# Patient Record
Sex: MALE | Race: WHITE | NOT HISPANIC OR LATINO | Employment: PART TIME | URBAN - METROPOLITAN AREA
[De-identification: names, ages, dates, MRNs, and addresses within clinical notes are randomized per-mention and may not be internally consistent; named-entity substitution may affect disease eponyms.]

---
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
## 2022-06-06 ENCOUNTER — HOSPITAL ENCOUNTER (EMERGENCY)
Facility: CLINIC | Age: 22
Discharge: HOME | End: 2022-06-06
Attending: EMERGENCY MEDICINE | Admitting: EMERGENCY MEDICINE
Payer: COMMERCIAL

## 2022-06-06 ENCOUNTER — APPOINTMENT (OUTPATIENT)
Dept: CT IMAGING | Facility: CLINIC | Age: 22
End: 2022-06-06
Attending: EMERGENCY MEDICINE
Payer: COMMERCIAL

## 2022-06-06 VITALS
RESPIRATION RATE: 18 BRPM | HEART RATE: 75 BPM | TEMPERATURE: 99.1 F | HEIGHT: 76 IN | WEIGHT: 195 LBS | SYSTOLIC BLOOD PRESSURE: 120 MMHG | OXYGEN SATURATION: 98 % | BODY MASS INDEX: 23.75 KG/M2 | DIASTOLIC BLOOD PRESSURE: 67 MMHG

## 2022-06-06 DIAGNOSIS — R42 LIGHTHEADEDNESS: ICD-10-CM

## 2022-06-06 LAB
ALBUMIN SERPL-MCNC: 4.3 G/DL (ref 3.4–5)
ALP SERPL-CCNC: 63 U/L (ref 40–150)
ALT SERPL W P-5'-P-CCNC: 18 U/L (ref 0–70)
ANION GAP SERPL CALCULATED.3IONS-SCNC: 5 MMOL/L (ref 3–14)
AST SERPL W P-5'-P-CCNC: 11 U/L (ref 0–45)
BASOPHILS # BLD AUTO: 0 10E3/UL (ref 0–0.2)
BASOPHILS NFR BLD AUTO: 0 %
BILIRUB SERPL-MCNC: 0.5 MG/DL (ref 0.2–1.3)
BUN SERPL-MCNC: 13 MG/DL (ref 7–30)
CALCIUM SERPL-MCNC: 8.8 MG/DL (ref 8.5–10.1)
CHLORIDE BLD-SCNC: 107 MMOL/L (ref 94–109)
CO2 SERPL-SCNC: 30 MMOL/L (ref 20–32)
CREAT SERPL-MCNC: 1.04 MG/DL (ref 0.66–1.25)
CRP SERPL-MCNC: <2.9 MG/L (ref 0–8)
EOSINOPHIL # BLD AUTO: 0.1 10E3/UL (ref 0–0.7)
EOSINOPHIL NFR BLD AUTO: 2 %
ERYTHROCYTE [DISTWIDTH] IN BLOOD BY AUTOMATED COUNT: 11.9 % (ref 10–15)
ERYTHROCYTE [SEDIMENTATION RATE] IN BLOOD BY WESTERGREN METHOD: 5 MM/HR (ref 0–15)
GFR SERPL CREATININE-BSD FRML MDRD: >90 ML/MIN/1.73M2
GLUCOSE BLD-MCNC: 70 MG/DL (ref 70–99)
HCT VFR BLD AUTO: 46.7 % (ref 40–53)
HGB BLD-MCNC: 15.6 G/DL (ref 13.3–17.7)
IMM GRANULOCYTES # BLD: 0 10E3/UL
IMM GRANULOCYTES NFR BLD: 0 %
LYMPHOCYTES # BLD AUTO: 3.2 10E3/UL (ref 0.8–5.3)
LYMPHOCYTES NFR BLD AUTO: 41 %
MCH RBC QN AUTO: 29.7 PG (ref 26.5–33)
MCHC RBC AUTO-ENTMCNC: 33.4 G/DL (ref 31.5–36.5)
MCV RBC AUTO: 89 FL (ref 78–100)
MONOCYTES # BLD AUTO: 0.6 10E3/UL (ref 0–1.3)
MONOCYTES NFR BLD AUTO: 8 %
NEUTROPHILS # BLD AUTO: 3.8 10E3/UL (ref 1.6–8.3)
NEUTROPHILS NFR BLD AUTO: 49 %
NRBC # BLD AUTO: 0 10E3/UL
NRBC BLD AUTO-RTO: 0 /100
PLATELET # BLD AUTO: 263 10E3/UL (ref 150–450)
POTASSIUM BLD-SCNC: 3.7 MMOL/L (ref 3.4–5.3)
PROT SERPL-MCNC: 7.4 G/DL (ref 6.8–8.8)
RBC # BLD AUTO: 5.25 10E6/UL (ref 4.4–5.9)
SODIUM SERPL-SCNC: 142 MMOL/L (ref 133–144)
WBC # BLD AUTO: 7.7 10E3/UL (ref 4–11)

## 2022-06-06 PROCEDURE — 70498 CT ANGIOGRAPHY NECK: CPT

## 2022-06-06 PROCEDURE — 36415 COLL VENOUS BLD VENIPUNCTURE: CPT | Performed by: EMERGENCY MEDICINE

## 2022-06-06 PROCEDURE — 250N000011 HC RX IP 250 OP 636: Performed by: EMERGENCY MEDICINE

## 2022-06-06 PROCEDURE — 70450 CT HEAD/BRAIN W/O DYE: CPT

## 2022-06-06 PROCEDURE — 85652 RBC SED RATE AUTOMATED: CPT | Performed by: EMERGENCY MEDICINE

## 2022-06-06 PROCEDURE — 99285 EMERGENCY DEPT VISIT HI MDM: CPT | Mod: 25

## 2022-06-06 PROCEDURE — 250N000009 HC RX 250: Performed by: EMERGENCY MEDICINE

## 2022-06-06 PROCEDURE — 86140 C-REACTIVE PROTEIN: CPT | Performed by: EMERGENCY MEDICINE

## 2022-06-06 PROCEDURE — 70496 CT ANGIOGRAPHY HEAD: CPT

## 2022-06-06 PROCEDURE — 85025 COMPLETE CBC W/AUTO DIFF WBC: CPT | Performed by: EMERGENCY MEDICINE

## 2022-06-06 PROCEDURE — 80053 COMPREHEN METABOLIC PANEL: CPT | Performed by: EMERGENCY MEDICINE

## 2022-06-06 PROCEDURE — 82040 ASSAY OF SERUM ALBUMIN: CPT | Performed by: EMERGENCY MEDICINE

## 2022-06-06 RX ORDER — IOPAMIDOL 755 MG/ML
75 INJECTION, SOLUTION INTRAVASCULAR ONCE
Status: COMPLETED | OUTPATIENT
Start: 2022-06-06 | End: 2022-06-06

## 2022-06-06 RX ADMIN — SODIUM CHLORIDE 90 ML: 900 INJECTION INTRAVENOUS at 21:57

## 2022-06-06 RX ADMIN — IOPAMIDOL 75 ML: 755 INJECTION, SOLUTION INTRAVENOUS at 21:57

## 2022-06-06 ASSESSMENT — ENCOUNTER SYMPTOMS
SORE THROAT: 0
DIZZINESS: 1
WEAKNESS: 0
LIGHT-HEADEDNESS: 1
NUMBNESS: 0
HEADACHES: 0
EYE PAIN: 0
NAUSEA: 0
ABDOMINAL PAIN: 0
NECK STIFFNESS: 1

## 2022-06-06 NOTE — ED TRIAGE NOTES
Dizziness lightheaded since Friday - seen on Saturday at urgent care - tingling down jolene arms lasting 30 mins       
DISPLAY PLAN FREE TEXT

## 2022-06-07 NOTE — ED PROVIDER NOTES
"No  History   Chief Complaint:  Dizziness     The history is provided by the patient and medical records.      Parish Haile is a 21 year old male who presents with dizziness. The patient states he has had intermittent episodes of lightheadedness, dizziness, brain fog, and feeling \"out of it\", since Thursday 4 days ago. He notes nothing exacerbates symptoms and episodes are random. He notes he was exercising 3 days ago when he had  onset of symptoms. He states he weight lifts and has not had a recent change in work out routine. He was seen by urgent care in [place] 2 days ago, and had a full unremarkable work up. This morning, he had a sudden onset of tingling to his bilateral temples that radiated down his posterior neck and spine, as well as bilateral hand weakness. He denies headaches, sore throat, or eye pain. He endorses mild neck and back stiffness. He denies chest pain or abdominal pain or nausea. He denies unilateral extremity weakness or numbness. He denies history of anxiety. He denies drug use. He denies current tobacco use. He states he is otherwise healthy. He lives in [place] and is currently in [place] visiting his parents. He does not have a PCP established.     The patient was seen at urgent care in [place] on 06/04/2022 due to lightheadedness. Blood work was done, which returned unremarkable. EKG without abnormality. COVID returned negative.     Review of Systems   HENT: Negative for sore throat.    Eyes: Negative for pain.   Cardiovascular: Negative for chest pain.   Gastrointestinal: Negative for abdominal pain and nausea.   Musculoskeletal: Positive for neck stiffness.   Neurological: Positive for dizziness and light-headedness. Negative for weakness (unilateral), numbness (unilateral) and headaches.   All other systems reviewed and are negative.    Allergies:  The patient has no known allergies.     Medications:  The patient is not currently taking any prescribed " "medications.    Past Medical History:     Wrist fracture     Past Surgical History:    ORIF forearm   Hemangioma removal, left ankle      Social History:  The patient was unaccompanied to the emergency department.    Physical Exam     Patient Vitals for the past 24 hrs:   BP Temp Temp src Pulse Resp SpO2 Height Weight   06/06/22 2245 120/67 -- -- 75 -- 98 % -- --   06/06/22 2230 126/72 -- -- 84 -- 99 % -- --   06/06/22 2215 127/73 -- -- 85 -- 99 % -- --   06/06/22 2145 131/75 -- -- 86 -- 99 % -- --   06/06/22 1621 (!) 147/74 99.1  F (37.3  C) Oral 106 18 100 % 1.93 m (6' 4\") 88.5 kg (195 lb)     Physical Exam  Physical Exam   Constitutional:  Patient is oriented to person, place, and time. They appear well-developed and well-nourished. Mild distress secondary to dizziness   HENT:    Tympanic membranes are normal bilaterally.   Mouth/Throat:   Oropharynx is clear and moist.   Eyes:    Conjunctivae normal and EOM are normal. Pupils are equal, round, and reactive to light.   Neck:    Full range of motion without pain. No focal vertebral tenderness.   Cardiovascular: Normal rate, regular rhythm and normal heart sounds.  Exam reveals no gallop and no friction rub.  No murmur heard.  Pulmonary/Chest:  Effort normal and breath sounds normal. Patient has no wheezes. Patient has no rales.   Abdominal:   Soft. Bowel sounds are normal. Patient exhibits no mass. There is no tenderness. There is no rebound and no guarding.   Musculoskeletal:  Normal range of motion. Patient exhibits no edema.   Neurological:   Patient is alert and oriented to person, place, and time. Patient has normal strength. No cranial nerve deficit or sensory deficit. GCS 15    National [employer] Stroke Scale  Exam Interval: Baseline   Score    Level of consciousness: (0)   Alert, keenly responsive    LOC questions: (0)   Answers both questions correctly    LOC commands: (0)   Performs both tasks correctly    Best gaze: (0)   Normal    Visual: " (0)   No visual loss    Facial palsy: (0)   Normal symmetrical movements    Motor arm (left): (0)   No drift    Motor arm (right): (0)   No drift    Motor leg (left): (0)   No drift    Motor leg (right): (0)   No drift    Limb ataxia: (0)   Absent    Sensory: (0)   Normal- no sensory loss    Best language: (0)   Normal- no aphasia    Dysarthria: (0)   Normal    Extinction and inattention: (0)   No abnormality        Total Score:  0     Skin:   Skin is warm and dry. No rash noted. No erythema.   Psychiatric:   Patient has a normal mood and affect. Patient's behavior is normal. Judgment and thought content normal.     Emergency Department Course   Imaging:  CTA Head Neck with Contrast   Final Result   IMPRESSION:       HEAD CTA:    1.  Normal CTA [place] [facility] [place].      NECK CTA:   1.  Normal neck CTA.         Head CT w/o contrast   Final Result   IMPRESSION:   1.  Normal head CT.      Report per radiology    Laboratory:  Labs Ordered and Resulted from Time of ED Arrival to Time of ED Departure   COMPREHENSIVE METABOLIC PANEL - Normal       Result Value    Sodium 142      Potassium 3.7      Chloride 107      Carbon Dioxide (CO2) 30      Anion Gap 5      Urea Nitrogen 13      Creatinine 1.04      Calcium 8.8      Glucose 70      Alkaline Phosphatase 63      AST 11      ALT 18      Protein Total 7.4      Albumin 4.3      Bilirubin Total 0.5      GFR Estimate >90     ERYTHROCYTE SEDIMENTATION RATE AUTO - Normal    Erythrocyte Sedimentation Rate 5     CRP INFLAMMATION - Normal    CRP Inflammation <2.9     CBC WITH PLATELETS AND DIFFERENTIAL    WBC Count 7.7      RBC Count 5.25      Hemoglobin 15.6      Hematocrit 46.7      MCV 89      MCH 29.7      MCHC 33.4      RDW 11.9      Platelet Count 263      % Neutrophils 49      % Lymphocytes 41      % Monocytes 8      % Eosinophils 2      % Basophils 0      % Immature Granulocytes 0      NRBCs per 100 WBC 0      Absolute Neutrophils 3.8      Absolute Lymphocytes 3.2       Absolute Monocytes 0.6      Absolute Eosinophils 0.1      Absolute Basophils 0.0      Absolute Immature Granulocytes 0.0      Absolute NRBCs 0.0        Emergency Department Course:     Reviewed:  I reviewed nursing notes, vitals, past medical history and Care Everywhere    Assessments:  2129 I obtained history and examined the patient as noted above.   2251 I rechecked the patient and explained findings.     Disposition:  The patient was discharged to home.     Impression & Plan   Medical Decision Making:  Parish Haile is a 21-year-old gentleman presenting to the emergency room with a tingling sensation and feeling foggy intermittently over the last few days.  His neurologic exam was completely normal.  His vital signs showed a temperature of 99.1 but otherwise normal.  I did review his blood work and EKG report from urgent care and this was all unremarkable.  He had no chest pain, shortness of breath, pleuritic pain or abdominal discomfort.  Basic blood work was performed which shows normal CBC as well as metabolic panel.  I did do CTA of his head neck and CT of his brain given his symptoms of tingling sensation in his bilateral temples.  This was not pain and this was not headache.  Fortunately the CT CTA does not show anything acute such as mass, bleed, aneurysm, dissection.  I doubt this is something intra-abdominal given his no complaints.  I do not think this is PE or dissection just based on no symptoms concerning for this.  At this time I feel he is safe for discharge but I am recommending follow-up with a primary care doctor.  The patient lives in [place] so he will have to check with his insurance to get a referral.  Return if any new or worsening symptoms occur.    Diagnosis:    ICD-10-CM    1. Lightheadedness  R42      Scribe Disclosure:  I, Char Arellano, am serving as a scribe at 9:26 PM on 6/6/2022 to document services personally performed by Millie Casarez MD based on my observations and the  provider's statements to me.     Millie Casarez MD  06/07/22 0005